# Patient Record
Sex: FEMALE | Race: WHITE | NOT HISPANIC OR LATINO | URBAN - METROPOLITAN AREA
[De-identification: names, ages, dates, MRNs, and addresses within clinical notes are randomized per-mention and may not be internally consistent; named-entity substitution may affect disease eponyms.]

---

## 2017-07-11 ENCOUNTER — HOSPITAL ENCOUNTER (OUTPATIENT)
Dept: OTHER | Facility: HOSPITAL | Age: 52
Discharge: HOME | End: 2017-07-11
Attending: FAMILY MEDICINE | Admitting: FAMILY MEDICINE

## 2017-07-11 ENCOUNTER — CONVERSION ENCOUNTER (OUTPATIENT)
Dept: FAMILY MEDICINE CLINIC | Facility: CLINIC | Age: 52
End: 2017-07-11

## 2017-07-12 LAB
ALBUMIN SERPL-MCNC: 4.3 G/DL (ref 3.6–5.1)
ALP SERPL-CCNC: 103 U/L (ref 33–130)
ALT SERPL-CCNC: 14 U/L (ref 6–29)
AST SERPL-CCNC: 13 U/L (ref 10–35)
BASOPHILS # BLD AUTO: 18 CELLS/UL (ref 0–200)
BASOPHILS NFR BLD AUTO: 0.2 %
BILIRUB SERPL-MCNC: 0.5 MG/DL (ref 0.2–1.2)
BUN SERPL-MCNC: 13 MG/DL (ref 7–25)
BUN/CREAT SERPL: ABNORMAL (CALC) (ref 6–22)
CALCIUM SERPL-MCNC: 9.3 MG/DL (ref 8.6–10.4)
CHLORIDE SERPL-SCNC: 106 MMOL/L (ref 98–110)
CHOLEST SERPL-MCNC: 220 MG/DL (ref 125–200)
CHOLEST/HDLC SERPL: 6.1 (CALC)
CONV CO2: 22 MMOL/L (ref 20–31)
CONV TOTAL PROTEIN: 6.7 G/DL (ref 6.1–8.1)
CREAT UR-MCNC: 0.77 MG/DL (ref 0.5–1.05)
EOSINOPHIL # BLD AUTO: 2.8 %
EOSINOPHIL # BLD AUTO: 252 CELLS/UL (ref 15–500)
ERYTHROCYTE [DISTWIDTH] IN BLOOD BY AUTOMATED COUNT: 13.3 % (ref 11–15)
GLOBULIN UR ELPH-MCNC: 2.4 MG/DL (ref 1.9–3.7)
GLUCOSE UR QL: 129 MG/DL (ref 65–99)
HCT VFR BLD AUTO: 39 % (ref 35–45)
HDLC SERPL-MCNC: 36 MG/DL
HGB BLD-MCNC: 12.7 G/DL (ref 11.7–15.5)
INSULIN SERPL-ACNC: 1.8 (CALC) (ref 1–2.5)
LDLC SERPL CALC-MCNC: 110 MG/DL
LYMPHOCYTES # BLD AUTO: 1755 CELLS/UL (ref 850–3900)
LYMPHOCYTES NFR BLD AUTO: 19.5 %
MCH RBC QN AUTO: 28.8 PG (ref 27–33)
MCHC RBC AUTO-ENTMCNC: 32.6 G/DL (ref 32–36)
MCV RBC AUTO: 88.4 FL (ref 80–100)
MONOCYTES # BLD AUTO: 342 CELLS/UL (ref 200–950)
MONOCYTES NFR BLD AUTO: 3.8 %
NEUTROPHILS # BLD AUTO: 6633 CELLS/UL (ref 1500–7800)
NEUTROPHILS NFR BLD AUTO: 73.7 %
NONHDLC SERPL-MCNC: 184 MG/DL
PLATELET # BLD AUTO: 283 10*3/UL (ref 140–400)
PMV BLD AUTO: 10 FL (ref 7.5–12.5)
POTASSIUM SERPL-SCNC: 4.1 MMOL/L (ref 3.5–5.3)
RBC # BLD AUTO: 4.41 MILLION/UL (ref 3.8–5.1)
SODIUM SERPL-SCNC: 139 MMOL/L (ref 135–146)
TRIGL SERPL-MCNC: 370 MG/DL
TSH SERPL-ACNC: 1.58 MIU/L
WBC # BLD AUTO: 9 10*3/UL (ref 3.8–10.8)

## 2024-02-26 ENCOUNTER — TRANSCRIBE ORDERS (OUTPATIENT)
Dept: ADMINISTRATIVE | Facility: HOSPITAL | Age: 59
End: 2024-02-26
Payer: COMMERCIAL

## 2024-02-26 ENCOUNTER — LAB (OUTPATIENT)
Dept: LAB | Facility: HOSPITAL | Age: 59
End: 2024-02-26
Payer: COMMERCIAL

## 2024-02-26 ENCOUNTER — HOSPITAL ENCOUNTER (OUTPATIENT)
Dept: GENERAL RADIOLOGY | Facility: HOSPITAL | Age: 59
Discharge: HOME | End: 2024-02-26
Payer: COMMERCIAL

## 2024-02-26 ENCOUNTER — HOSPITAL ENCOUNTER (OUTPATIENT)
Dept: CARDIOLOGY | Facility: HOSPITAL | Age: 59
Discharge: HOME | End: 2024-02-26
Payer: COMMERCIAL

## 2024-02-26 DIAGNOSIS — Z01.818 OTHER SPECIFIED PRE-OPERATIVE EXAMINATION: ICD-10-CM

## 2024-02-26 DIAGNOSIS — Z01.818 OTHER SPECIFIED PRE-OPERATIVE EXAMINATION: Primary | ICD-10-CM

## 2024-02-26 LAB
ABO GROUP BLD: NORMAL
ALBUMIN SERPL-MCNC: 4.4 G/DL (ref 3.5–5.2)
ALBUMIN/GLOB SERPL: 1.4 G/DL
ALP SERPL-CCNC: 159 U/L (ref 39–117)
ALT SERPL W P-5'-P-CCNC: 28 U/L (ref 1–33)
ANION GAP SERPL CALCULATED.3IONS-SCNC: 14 MMOL/L (ref 5–15)
AST SERPL-CCNC: 27 U/L (ref 1–32)
BASOPHILS # BLD AUTO: 0.1 10*3/MM3 (ref 0–0.2)
BASOPHILS NFR BLD AUTO: 0.6 % (ref 0–1.5)
BILIRUB SERPL-MCNC: 0.3 MG/DL (ref 0–1.2)
BLD GP AB SCN SERPL QL: NEGATIVE
BUN SERPL-MCNC: 13 MG/DL (ref 6–20)
BUN/CREAT SERPL: 21 (ref 7–25)
CALCIUM SPEC-SCNC: 9.8 MG/DL (ref 8.6–10.5)
CHLORIDE SERPL-SCNC: 99 MMOL/L (ref 98–107)
CO2 SERPL-SCNC: 23 MMOL/L (ref 22–29)
CREAT SERPL-MCNC: 0.62 MG/DL (ref 0.57–1)
DEPRECATED RDW RBC AUTO: 43.3 FL (ref 37–54)
EGFRCR SERPLBLD CKD-EPI 2021: 104 ML/MIN/1.73
EOSINOPHIL # BLD AUTO: 0.3 10*3/MM3 (ref 0–0.4)
EOSINOPHIL NFR BLD AUTO: 2.5 % (ref 0.3–6.2)
ERYTHROCYTE [DISTWIDTH] IN BLOOD BY AUTOMATED COUNT: 13.4 % (ref 12.3–15.4)
GLOBULIN UR ELPH-MCNC: 3.2 GM/DL
GLUCOSE SERPL-MCNC: 237 MG/DL (ref 65–99)
HCT VFR BLD AUTO: 42.1 % (ref 34–46.6)
HGB BLD-MCNC: 14 G/DL (ref 12–15.9)
LYMPHOCYTES # BLD AUTO: 3.7 10*3/MM3 (ref 0.7–3.1)
LYMPHOCYTES NFR BLD AUTO: 33.5 % (ref 19.6–45.3)
MCH RBC QN AUTO: 29.3 PG (ref 26.6–33)
MCHC RBC AUTO-ENTMCNC: 33.4 G/DL (ref 31.5–35.7)
MCV RBC AUTO: 87.7 FL (ref 79–97)
MONOCYTES # BLD AUTO: 0.5 10*3/MM3 (ref 0.1–0.9)
MONOCYTES NFR BLD AUTO: 4.2 % (ref 5–12)
NEUTROPHILS NFR BLD AUTO: 59.2 % (ref 42.7–76)
NEUTROPHILS NFR BLD AUTO: 6.5 10*3/MM3 (ref 1.7–7)
NRBC BLD AUTO-RTO: 0 /100 WBC (ref 0–0.2)
PLATELET # BLD AUTO: 332 10*3/MM3 (ref 140–450)
PMV BLD AUTO: 8.8 FL (ref 6–12)
POTASSIUM SERPL-SCNC: 3.8 MMOL/L (ref 3.5–5.2)
PROT SERPL-MCNC: 7.6 G/DL (ref 6–8.5)
RBC # BLD AUTO: 4.8 10*6/MM3 (ref 3.77–5.28)
RH BLD: POSITIVE
SODIUM SERPL-SCNC: 136 MMOL/L (ref 136–145)
T&S EXPIRATION DATE: NORMAL
WBC NRBC COR # BLD AUTO: 10.9 10*3/MM3 (ref 3.4–10.8)

## 2024-02-26 PROCEDURE — 86850 RBC ANTIBODY SCREEN: CPT

## 2024-02-26 PROCEDURE — 86901 BLOOD TYPING SEROLOGIC RH(D): CPT

## 2024-02-26 PROCEDURE — 86900 BLOOD TYPING SEROLOGIC ABO: CPT

## 2024-02-26 PROCEDURE — 36415 COLL VENOUS BLD VENIPUNCTURE: CPT

## 2024-02-26 PROCEDURE — 80053 COMPREHEN METABOLIC PANEL: CPT

## 2024-02-26 PROCEDURE — 93005 ELECTROCARDIOGRAM TRACING: CPT | Performed by: OBSTETRICS & GYNECOLOGY

## 2024-02-26 PROCEDURE — 71046 X-RAY EXAM CHEST 2 VIEWS: CPT

## 2024-02-26 PROCEDURE — 85025 COMPLETE CBC W/AUTO DIFF WBC: CPT

## 2024-02-26 PROCEDURE — 93010 ELECTROCARDIOGRAM REPORT: CPT | Performed by: INTERNAL MEDICINE

## 2024-02-27 ENCOUNTER — TELEPHONE (OUTPATIENT)
Dept: CARDIOLOGY | Facility: CLINIC | Age: 59
End: 2024-02-27
Payer: COMMERCIAL

## 2024-02-27 NOTE — TELEPHONE ENCOUNTER
Called patient and left message to call back. She needs to make apt for cardiac clearance.   I have apt 2/28 @ 9:15 with Dr. Jose J kelly for her.

## 2024-02-27 NOTE — TELEPHONE ENCOUNTER
FACILITY: [facility]  DR: ZAFAR KHAN  PHONE: [phone]  FAX: [phone]  PROCEDURE: POSTERIOR REPAIR SCHEDULED: 3/5/24  MEDS TO HOLD:     PATIENT HAS NEW PATIENT APT 2/28/24 WITH DR. CHAIREZ.

## 2024-02-28 ENCOUNTER — TELEPHONE (OUTPATIENT)
Dept: CARDIOLOGY | Facility: CLINIC | Age: 59
End: 2024-02-28

## 2024-02-28 ENCOUNTER — OFFICE VISIT (OUTPATIENT)
Dept: CARDIOLOGY | Facility: CLINIC | Age: 59
End: 2024-02-28
Payer: COMMERCIAL

## 2024-02-28 VITALS
BODY MASS INDEX: 31.07 KG/M2 | DIASTOLIC BLOOD PRESSURE: 78 MMHG | HEART RATE: 91 BPM | HEIGHT: 68 IN | WEIGHT: 205 LBS | SYSTOLIC BLOOD PRESSURE: 133 MMHG | OXYGEN SATURATION: 97 %

## 2024-02-28 DIAGNOSIS — Z13.9 SCREENING DUE: ICD-10-CM

## 2024-02-28 DIAGNOSIS — E66.09 CLASS 1 OBESITY DUE TO EXCESS CALORIES WITHOUT SERIOUS COMORBIDITY WITH BODY MASS INDEX (BMI) OF 31.0 TO 31.9 IN ADULT: ICD-10-CM

## 2024-02-28 DIAGNOSIS — R01.1 HEART MURMUR: ICD-10-CM

## 2024-02-28 DIAGNOSIS — Z01.810 PRE-OPERATIVE CARDIOVASCULAR EXAMINATION: Primary | ICD-10-CM

## 2024-02-28 DIAGNOSIS — I10 PRIMARY HYPERTENSION: ICD-10-CM

## 2024-02-28 LAB
QT INTERVAL: 343 MS
QTC INTERVAL: 446 MS

## 2024-02-28 RX ORDER — ASPIRIN 81 MG/1
81 TABLET ORAL DAILY
COMMUNITY
Start: 2017-07-17

## 2024-02-28 RX ORDER — LISINOPRIL 20 MG/1
20 TABLET ORAL DAILY
COMMUNITY
Start: 2024-01-19

## 2024-02-28 RX ORDER — IBUPROFEN 200 MG
200 TABLET ORAL EVERY 6 HOURS PRN
COMMUNITY

## 2024-02-28 RX ORDER — TOPIRAMATE 100 MG/1
TABLET, FILM COATED ORAL
COMMUNITY
Start: 2017-08-23 | End: 2024-02-28

## 2024-02-28 RX ORDER — ACETAMINOPHEN 325 MG/1
325 TABLET ORAL EVERY 4 HOURS PRN
COMMUNITY

## 2024-02-28 RX ORDER — AMLODIPINE BESYLATE 5 MG/1
1 TABLET ORAL DAILY
COMMUNITY
Start: 2024-01-18

## 2024-02-28 NOTE — PROGRESS NOTES
Encounter Date:2024        Patient ID: Kelli Ramsey is a 58 y.o. female.      Chief Complaint:      History of Present Illness  58 years old woman with hypertension currently on lisinopril and amlodipine, obesity who presents to cardiology office to establish care.  She seeks preoperative cardiovascular risk assessment prior to undergoing posterior repair.  She was noted to have an abnormal ECG which showed sinus tachycardia, poor R wave progression in the anterior leads and suggestion of Q waves secondary to left ventricular hypertrophy.  She currently denies any chest pain or shortness of breath.  She has had a murmur.  Her brother  suddenly.  She does not smoke or drink.  She denies any leg edema, orthopnea or PND.    The following portions of the patient's history were reviewed and updated as appropriate: allergies, current medications, past family history, past medical history, past social history, past surgical history, and problem list.    Review of Systems   Constitutional: Negative for malaise/fatigue.   Cardiovascular:  Negative for chest pain, dyspnea on exertion, leg swelling and palpitations.   Respiratory:  Negative for cough and shortness of breath.    Gastrointestinal:  Negative for abdominal pain, nausea and vomiting.   Neurological:  Negative for dizziness, focal weakness, headaches, light-headedness and numbness.   All other systems reviewed and are negative.        Current Outpatient Medications:     acetaminophen (Tylenol) 325 MG tablet, Take 1 tablet by mouth Every 4 (Four) Hours As Needed., Disp: , Rfl:     amLODIPine (NORVASC) 5 MG tablet, Take 1 tablet by mouth Daily., Disp: , Rfl:     ibuprofen (ADVIL,MOTRIN) 200 MG tablet, Take 1 tablet by mouth Every 6 (Six) Hours As Needed., Disp: , Rfl:     lisinopril (PRINIVIL,ZESTRIL) 20 MG tablet, Take 1 tablet by mouth Daily., Disp: , Rfl:     NON FORMULARY, Allergy pill prn, Disp: , Rfl:     aspirin (Ecotrin Low Strength) 81 MG EC  "tablet, Take 1 tablet by mouth Daily. (Patient not taking: Reported on 2/28/2024), Disp: , Rfl:     Current outpatient and discharge medications have been reconciled for the patient.  Reviewed by: Sanya Lux MD       No Known Allergies    History reviewed. No pertinent family history.    Past Surgical History:   Procedure Laterality Date    ANKLE SURGERY Left 2019    CYSTOSCOPY BLADDER STONE LITHOTRIPSY  2003       Past Medical History:   Diagnosis Date    Abnormal ECG     Hypertension        History reviewed. No pertinent family history.    Social History     Socioeconomic History    Marital status:    Tobacco Use    Smoking status: Never    Smokeless tobacco: Never   Vaping Use    Vaping Use: Never used   Substance and Sexual Activity    Alcohol use: Yes    Drug use: Defer    Sexual activity: Defer               Objective:       Physical Exam    /78   Pulse 91   Ht 172.7 cm (68\")   Wt 93 kg (205 lb)   SpO2 97%   BMI 31.17 kg/m²   The patient is alert, oriented and in no distress.    Vital signs as noted above.    Head and neck revealed no carotid bruits or jugular venous distension.  No thyromegaly or lymphadenopathy is present.    Lungs clear.  No wheezing.  Breath sounds are normal bilaterally.    Heart normal first and second heart sounds.  No murmur..  No pericardial rub is present.  No gallop is present.    Abdomen soft and nontender.  No organomegaly is present.    Extremities revealed good peripheral pulses without any pedal edema.    Skin warm and dry.    Musculoskeletal system is grossly normal.    CNS grossly normal.           Diagnosis Plan   1. Pre-operative cardiovascular examination  Lipid Panel    Hemoglobin A1c      2. Primary hypertension        3. Heart murmur  Adult Transthoracic Echo Complete W/ Cont if Necessary Per Protocol      4. Screening due  Lipid Panel      LAB RESULTS (LAST 7 DAYS)    CBC  Results from last 7 days   Lab Units 02/26/24  1634   WBC 10*3/mm3 10.90* "   RBC 10*6/mm3 4.80   HEMOGLOBIN g/dL 14.0   HEMATOCRIT % 42.1   MCV fL 87.7   PLATELETS 10*3/mm3 332       BMP  Results from last 7 days   Lab Units 02/26/24  1634   SODIUM mmol/L 136   POTASSIUM mmol/L 3.8   CHLORIDE mmol/L 99   CO2 mmol/L 23.0   BUN mg/dL 13   CREATININE mg/dL 0.62   GLUCOSE mg/dL 237*       CMP   Results from last 7 days   Lab Units 02/26/24  1634   SODIUM mmol/L 136   POTASSIUM mmol/L 3.8   CHLORIDE mmol/L 99   CO2 mmol/L 23.0   BUN mg/dL 13   CREATININE mg/dL 0.62   GLUCOSE mg/dL 237*   ALBUMIN g/dL 4.4   BILIRUBIN mg/dL 0.3   ALK PHOS U/L 159*   AST (SGOT) U/L 27   ALT (SGPT) U/L 28         BNP        TROPONIN        CoAg        Creatinine Clearance  Estimated Creatinine Clearance: 119.3 mL/min (by C-G formula based on SCr of 0.62 mg/dL).    ABG        Radiology  XR Chest 2 View    Result Date: 2/27/2024  Impression: Chronic scarring in the left lung. No acute chest findings. Electronically Signed: Jossy Lopez MD  2/27/2024 9:00 AM EST  Workstation ID: [id number]     EKG  Procedures    Stress test      Echocardiogram      Cardiac catheterization  No results found for this or any previous visit.          Assessment and Plan       Diagnoses and all orders for this visit:    1. Pre-operative cardiovascular examination (Primary)  In the absence of angina, arrhythmia or heart failure symptoms she may proceed with posterior repair for rectocele with no further cardiac workup.  I have reviewed the ECG which shows evidence of left ventricular hypertrophy due to long-term hypertension.  Aspirin will be stopped prior to the procedure.    2. Primary hypertension  Currently on lisinopril and amlodipine.  I have recommended her to take lisinopril in the morning and amlodipine at night.  She will maintain a blood pressure log and bring it during next visit.  I have seen her blood pressure log which shows systolic blood pressure in the 120s at home.    3. Heart murmur  -     Adult Transthoracic Echo  Complete W/ Cont if Necessary Per Protocol  She has a systolic murmur at the right upper sternal border.  She also has a history of sudden cardiac death in her brother.  I will obtain an echocardiogram.  Echocardiogram is not needed for surgery clearance.    4. Screening due  I will obtain a lipid panel and A1c to screen her for hyperlipidemia and diabetes.    5.  Obesity  BMI is 31.17  Lifestyle modifications recommended to the patient  Also recommended screening and treatment for sleep apnea

## 2024-02-28 NOTE — TELEPHONE ENCOUNTER
Patient seen at office visit with Dr. Lux who signed cardiac clearance note for upcoming Posterior repair surgery with Dr. Holly Gao; Clearance note faxed to [phone]

## 2024-04-22 ENCOUNTER — HOSPITAL ENCOUNTER (OUTPATIENT)
Dept: CARDIOLOGY | Facility: HOSPITAL | Age: 59
Discharge: HOME | End: 2024-04-22
Admitting: INTERNAL MEDICINE
Payer: COMMERCIAL

## 2024-04-22 VITALS
BODY MASS INDEX: 31.07 KG/M2 | WEIGHT: 205 LBS | SYSTOLIC BLOOD PRESSURE: 170 MMHG | DIASTOLIC BLOOD PRESSURE: 82 MMHG | HEIGHT: 68 IN

## 2024-04-22 LAB
BH CV ECHO MEAS - ACS: 1.59 CM
BH CV ECHO MEAS - AI P1/2T: 542.5 MSEC
BH CV ECHO MEAS - AO MAX PG: 22.2 MMHG
BH CV ECHO MEAS - AO MEAN PG: 10.1 MMHG
BH CV ECHO MEAS - AO ROOT DIAM: 3.1 CM
BH CV ECHO MEAS - AO V2 MAX: 235.4 CM/SEC
BH CV ECHO MEAS - AO V2 VTI: 44 CM
BH CV ECHO MEAS - AVA(I,D): 1.99 CM2
BH CV ECHO MEAS - EDV(CUBED): 105.7 ML
BH CV ECHO MEAS - EDV(MOD-SP4): 60.4 ML
BH CV ECHO MEAS - EF(MOD-BP): 65 %
BH CV ECHO MEAS - EF(MOD-SP4): 69.2 %
BH CV ECHO MEAS - ESV(CUBED): 31.3 ML
BH CV ECHO MEAS - ESV(MOD-SP4): 18.6 ML
BH CV ECHO MEAS - FS: 33.3 %
BH CV ECHO MEAS - IVS/LVPW: 1 CM
BH CV ECHO MEAS - IVSD: 1.2 CM
BH CV ECHO MEAS - LA DIMENSION: 3.1 CM
BH CV ECHO MEAS - LV DIASTOLIC VOL/BSA (35-75): 29.2 CM2
BH CV ECHO MEAS - LV MASS(C)D: 214.2 GRAMS
BH CV ECHO MEAS - LV MAX PG: 7 MMHG
BH CV ECHO MEAS - LV MEAN PG: 3.5 MMHG
BH CV ECHO MEAS - LV SYSTOLIC VOL/BSA (12-30): 9 CM2
BH CV ECHO MEAS - LV V1 MAX: 132 CM/SEC
BH CV ECHO MEAS - LV V1 VTI: 30.4 CM
BH CV ECHO MEAS - LVIDD: 4.7 CM
BH CV ECHO MEAS - LVIDS: 3.2 CM
BH CV ECHO MEAS - LVOT AREA: 2.9 CM2
BH CV ECHO MEAS - LVOT DIAM: 1.92 CM
BH CV ECHO MEAS - LVPWD: 1.2 CM
BH CV ECHO MEAS - MR MAX PG: 72.3 MMHG
BH CV ECHO MEAS - MR MAX VEL: 424.8 CM/SEC
BH CV ECHO MEAS - MV A MAX VEL: 145.6 CM/SEC
BH CV ECHO MEAS - MV DEC SLOPE: 337.2 CM/SEC2
BH CV ECHO MEAS - MV DEC TIME: 0.31 SEC
BH CV ECHO MEAS - MV E MAX VEL: 104.9 CM/SEC
BH CV ECHO MEAS - MV E/A: 0.72
BH CV ECHO MEAS - MV MAX PG: 8.8 MMHG
BH CV ECHO MEAS - MV MEAN PG: 3.6 MMHG
BH CV ECHO MEAS - MV V2 VTI: 34.7 CM
BH CV ECHO MEAS - MVA(VTI): 2.5 CM2
BH CV ECHO MEAS - PA ACC TIME: 0.09 SEC
BH CV ECHO MEAS - PA V2 MAX: 116.9 CM/SEC
BH CV ECHO MEAS - PI END-D VEL: 104.5 CM/SEC
BH CV ECHO MEAS - PULM A REVS DUR: 0.09 SEC
BH CV ECHO MEAS - PULM A REVS VEL: 35 CM/SEC
BH CV ECHO MEAS - PULM DIAS VEL: 39.3 CM/SEC
BH CV ECHO MEAS - PULM S/D: 1.94
BH CV ECHO MEAS - PULM SYS VEL: 76.1 CM/SEC
BH CV ECHO MEAS - RAP SYSTOLE: 3 MMHG
BH CV ECHO MEAS - RVSP: 41.8 MMHG
BH CV ECHO MEAS - SV(LVOT): 87.7 ML
BH CV ECHO MEAS - SV(MOD-SP4): 41.8 ML
BH CV ECHO MEAS - SVI(MOD-SP4): 20.2 ML/M2
BH CV ECHO MEAS - TAPSE (>1.6): 2.6 CM
BH CV ECHO MEAS - TR MAX PG: 38.8 MMHG
BH CV ECHO MEAS - TR MAX VEL: 302.7 CM/SEC
BH CV XLRA - RV BASE: 3.7 CM
BH CV XLRA - RV MID: 1.9 CM

## 2024-04-22 PROCEDURE — 93306 TTE W/DOPPLER COMPLETE: CPT

## 2024-04-22 PROCEDURE — 93306 TTE W/DOPPLER COMPLETE: CPT | Performed by: INTERNAL MEDICINE

## 2024-04-28 LAB
AMBIG ABBREV LP DEFAULT: NORMAL
CHOLEST SERPL-MCNC: 268 MG/DL (ref 100–199)
HDLC SERPL-MCNC: 44 MG/DL
LDLC SERPL CALC-MCNC: 163 MG/DL (ref 0–99)
TRIGL SERPL-MCNC: 320 MG/DL (ref 0–149)
VLDLC SERPL CALC-MCNC: 61 MG/DL (ref 5–40)

## 2024-05-01 ENCOUNTER — TELEPHONE (OUTPATIENT)
Dept: CARDIOLOGY | Facility: CLINIC | Age: 59
End: 2024-05-01
Payer: COMMERCIAL

## 2024-05-01 DIAGNOSIS — E78.2 MIXED HYPERLIPIDEMIA: Primary | ICD-10-CM

## 2024-05-01 RX ORDER — ATORVASTATIN CALCIUM 40 MG/1
40 TABLET, FILM COATED ORAL DAILY
Qty: 90 TABLET | Refills: 3 | Status: SHIPPED | OUTPATIENT
Start: 2024-05-01

## 2024-05-01 NOTE — TELEPHONE ENCOUNTER
Called and spoke to patient. Reviewed results of recent lipid panel. She will be started on atorvastatin 40 mg daily. Lifestyle modifications, including diet and exercise, also recommended. She will get a repeat CMP and lipid panel in 3 months. Patient verbalized understanding and is agreeable to this plan. New prescription sent to [facility] in [place].     Electronically signed by XIMENA Lovett, 05/01/24, 2:16 PM EDT.

## 2024-05-26 NOTE — PROGRESS NOTES
Encounter Date:2024        Patient ID: Kelli Ramsey is a 58 y.o. female.      Chief Complaint:      History of Present Illness  58 years old woman with hypertension, hyperlipidemia, obesity presents to cardiology office for follow-up..    She was noted to have an abnormal ECG which showed sinus tachycardia, poor R wave progression in the anterior leads and suggestion of Q waves secondary to left ventricular hypertrophy.    Her brother  suddenly.  She does not smoke or drink.  She denies any leg edema, orthopnea or PND.    Current cardiac medications include amlodipine, lisinopril, atorvastatin.    The following portions of the patient's history were reviewed and updated as appropriate: allergies, current medications, past family history, past medical history, past social history, past surgical history, and problem list.    Review of Systems   Constitutional: Negative for malaise/fatigue.   Cardiovascular:  Negative for chest pain, dyspnea on exertion, leg swelling and palpitations.   Respiratory:  Negative for cough and shortness of breath.    Gastrointestinal:  Negative for abdominal pain, nausea and vomiting.   Neurological:  Negative for dizziness, focal weakness, headaches, light-headedness and numbness.   All other systems reviewed and are negative.        Current Outpatient Medications:     acetaminophen (Tylenol) 325 MG tablet, Take 1 tablet by mouth Every 4 (Four) Hours As Needed., Disp: , Rfl:     amLODIPine (NORVASC) 5 MG tablet, Take 1 tablet by mouth Daily., Disp: , Rfl:     atorvastatin (LIPITOR) 40 MG tablet, Take 1 tablet by mouth Daily., Disp: 90 tablet, Rfl: 3    estradiol (ESTRACE) 0.1 MG/GM vaginal cream, Insert 2 g into the vagina Daily., Disp: , Rfl:     ibuprofen (ADVIL,MOTRIN) 200 MG tablet, Take 1 tablet by mouth Every 6 (Six) Hours As Needed., Disp: , Rfl:     lisinopril (PRINIVIL,ZESTRIL) 20 MG tablet, Take 1 tablet by mouth Daily., Disp: , Rfl:     Current outpatient and  "discharge medications have been reconciled for the patient.  Reviewed by: Sanya Lux MD       No Known Allergies    Family History   Problem Relation Age of Onset    Hypertension Mother     Hypertension Father        Past Surgical History:   Procedure Laterality Date    ANKLE SURGERY Left 2019    CYSTOSCOPY BLADDER STONE LITHOTRIPSY  2003    RECTOCELE REPAIR         Past Medical History:   Diagnosis Date    Abnormal ECG     Hypertension        Family History   Problem Relation Age of Onset    Hypertension Mother     Hypertension Father        Social History     Socioeconomic History    Marital status:    Tobacco Use    Smoking status: Never    Smokeless tobacco: Never   Vaping Use    Vaping status: Never Used   Substance and Sexual Activity    Alcohol use: Yes    Drug use: Defer    Sexual activity: Defer               Objective:       Physical Exam    /90 (BP Location: Left arm, Patient Position: Sitting, Cuff Size: Large Adult)   Pulse 95   Ht 170.2 cm (67\")   Wt 93.4 kg (206 lb)   LMP  (LMP Unknown)   SpO2 98%   BMI 32.26 kg/m²   The patient is alert, oriented and in no distress.    Vital signs as noted above.    Head and neck revealed no carotid bruits or jugular venous distension.  No thyromegaly or lymphadenopathy is present.    Lungs clear.  No wheezing.  Breath sounds are normal bilaterally.    Heart normal first and second heart sounds.  Early peaking systolic ejection murmur at right upper sternal border..  No pericardial rub is present.  No gallop is present.    Abdomen soft and nontender.  No organomegaly is present.    Extremities revealed good peripheral pulses without any pedal edema.    Skin warm and dry.    Musculoskeletal system is grossly normal.    CNS grossly normal.           Diagnosis Plan   1. Mixed hyperlipidemia        2. Pre-operative cardiovascular examination        3. Primary hypertension        4. Heart murmur        5. Screening due        6. Class 1 obesity due to " excess calories without serious comorbidity with body mass index (BMI) of 31.0 to 31.9 in adult        7. Other specified pre-operative examination        LAB RESULTS (LAST 7 DAYS)    CBC        BMP        CMP         BNP        TROPONIN        CoAg        Creatinine Clearance  CrCl cannot be calculated (Patient's most recent lab result is older than the maximum 30 days allowed.).    ABG        Radiology  No radiology results for the last day    EKG  Procedures    Stress test      Echocardiogram  Results for orders placed in visit on 02/28/24    Adult Transthoracic Echo Complete W/ Cont if Necessary Per Protocol    Interpretation Summary    Left ventricular ejection fraction appears to be 61 - 65%.    Left ventricular diastolic function is consistent with (grade I) impaired relaxation.    Mild aortic valve stenosis is present. Mean/peak gradient of 10/22 mmHg. JULIA 1.9cm2.    Estimated right ventricular systolic pressure from tricuspid regurgitation is mildly elevated (35-45 mmHg).      Cardiac catheterization  No results found for this or any previous visit.          Assessment and Plan       Diagnoses and all orders for this visit:    1. Mixed hyperlipidemia (Primary)    2. Pre-operative cardiovascular examination    3. Primary hypertension    4. Heart murmur    5. Screening due    6. Class 1 obesity due to excess calories without serious comorbidity with body mass index (BMI) of 31.0 to 31.9 in adult    7. Other specified pre-operative examination         1. Pre-operative cardiovascular examination (Primary)  In the absence of angina, arrhythmia or heart failure symptoms she may proceed with posterior repair for rectocele with no further cardiac workup.  I have reviewed the ECG which shows evidence of left ventricular hypertrophy due to long-term hypertension.  Aspirin will be stopped prior to the procedure.     2. Primary hypertension  Currently on lisinopril and amlodipine.  I have recommended her to take  lisinopril in the morning and amlodipine at night.  Blood pressure is normal  Home blood pressure logs of systolic blood pressure in the 120s.     3. Heart murmur  Echocardiogram suggestive of mild aortic valve stenosis with mean/peak gradient of 10/22 mmHg  She has a systolic murmur at the right upper sternal border.  She also has a history of sudden cardiac death in her brother.  Will follow-up based on symptoms and repeat echo in 2 years.  February 2026  Patient is currently asymptomatic.     4. Screening due  , HDL 44, triglyceride 320 and total cholesterol 268  Started on Lipitor  Repeat lipid panel and A1c in August 2024     5.  Obesity  BMI is 32.3.  She weighs 206 pounds  Lifestyle modifications recommended to the patient.  Specially discussed dietary changes and exercise.  Also recommended screening and treatment for sleep apnea

## 2024-05-28 ENCOUNTER — OFFICE VISIT (OUTPATIENT)
Dept: CARDIOLOGY | Facility: CLINIC | Age: 59
End: 2024-05-28
Payer: COMMERCIAL

## 2024-05-28 VITALS
SYSTOLIC BLOOD PRESSURE: 139 MMHG | BODY MASS INDEX: 32.33 KG/M2 | DIASTOLIC BLOOD PRESSURE: 88 MMHG | OXYGEN SATURATION: 98 % | HEIGHT: 67 IN | WEIGHT: 206 LBS | HEART RATE: 95 BPM

## 2024-05-28 DIAGNOSIS — Z13.9 SCREENING DUE: ICD-10-CM

## 2024-05-28 DIAGNOSIS — E78.2 MIXED HYPERLIPIDEMIA: Primary | ICD-10-CM

## 2024-05-28 DIAGNOSIS — I10 PRIMARY HYPERTENSION: ICD-10-CM

## 2024-05-28 DIAGNOSIS — Z01.818 OTHER SPECIFIED PRE-OPERATIVE EXAMINATION: ICD-10-CM

## 2024-05-28 DIAGNOSIS — E66.09 CLASS 1 OBESITY DUE TO EXCESS CALORIES WITHOUT SERIOUS COMORBIDITY WITH BODY MASS INDEX (BMI) OF 31.0 TO 31.9 IN ADULT: ICD-10-CM

## 2024-05-28 DIAGNOSIS — Z01.810 PRE-OPERATIVE CARDIOVASCULAR EXAMINATION: ICD-10-CM

## 2024-05-28 DIAGNOSIS — R01.1 HEART MURMUR: ICD-10-CM

## 2024-05-28 PROCEDURE — 99214 OFFICE O/P EST MOD 30 MIN: CPT | Performed by: INTERNAL MEDICINE

## 2024-05-28 RX ORDER — ESTRADIOL 0.1 MG/G
2 CREAM VAGINAL DAILY
COMMUNITY
Start: 2024-04-18

## 2024-09-04 ENCOUNTER — TELEPHONE (OUTPATIENT)
Dept: CARDIOLOGY | Facility: CLINIC | Age: 59
End: 2024-09-04
Payer: COMMERCIAL

## 2024-09-04 NOTE — TELEPHONE ENCOUNTER
CALLED PATIENT TO REMIND HER ABOUT OVER DUE LABS FOR LIPID PANEL (WHICH IS A FASTING LAB), CMP, & A1C.   NO ANSWER, LMOM

## 2024-09-04 NOTE — TELEPHONE ENCOUNTER
PATIENT CALLED BACK. HER MOTHER REQUIRES 24 HOUR CARE AND SHE HAS NOT HAD TIME TO DO LABS. SHE DOES HAVE IT ON HER TO-DO LIST AND PLANS ON GOING ON A SATURDAY IN SEPTEMBER.

## 2024-10-11 LAB
ALBUMIN SERPL-MCNC: 4.6 G/DL (ref 3.8–4.9)
ALP SERPL-CCNC: 156 IU/L (ref 44–121)
ALT SERPL-CCNC: 22 IU/L (ref 0–32)
AMBIG ABBREV CMP14 DEFAULT: NORMAL
AMBIG ABBREV LP DEFAULT: NORMAL
AST SERPL-CCNC: 20 IU/L (ref 0–40)
BILIRUB SERPL-MCNC: 0.9 MG/DL (ref 0–1.2)
BUN SERPL-MCNC: 9 MG/DL (ref 6–24)
BUN/CREAT SERPL: 14 (ref 9–23)
CALCIUM SERPL-MCNC: 9.8 MG/DL (ref 8.7–10.2)
CHLORIDE SERPL-SCNC: 99 MMOL/L (ref 96–106)
CHOLEST SERPL-MCNC: 171 MG/DL (ref 100–199)
CO2 SERPL-SCNC: 24 MMOL/L (ref 20–29)
CREAT SERPL-MCNC: 0.63 MG/DL (ref 0.57–1)
EGFRCR SERPLBLD CKD-EPI 2021: 103 ML/MIN/1.73
GLOBULIN SER CALC-MCNC: 2.4 G/DL (ref 1.5–4.5)
GLUCOSE SERPL-MCNC: 285 MG/DL (ref 70–99)
HBA1C MFR BLD: 13.5 % (ref 4.8–5.6)
HDLC SERPL-MCNC: 46 MG/DL
LDLC SERPL CALC-MCNC: 88 MG/DL (ref 0–99)
POTASSIUM SERPL-SCNC: 4.3 MMOL/L (ref 3.5–5.2)
PROT SERPL-MCNC: 7 G/DL (ref 6–8.5)
SODIUM SERPL-SCNC: 138 MMOL/L (ref 134–144)
TRIGL SERPL-MCNC: 220 MG/DL (ref 0–149)
VLDLC SERPL CALC-MCNC: 37 MG/DL (ref 5–40)

## 2024-10-14 ENCOUNTER — TELEPHONE (OUTPATIENT)
Dept: CARDIOLOGY | Facility: CLINIC | Age: 59
End: 2024-10-14
Payer: COMMERCIAL

## 2024-10-14 NOTE — TELEPHONE ENCOUNTER
Called and spoke to patient. Reviewed results of recent CMP, lipid panel and A1c. She does not have a prior diagnosis of diabetes mellitus. Her A1c is elevated at 13.5%. She was advised to make an appointment ASAP with her PCP for further treatment recommendations. She verbalized understanding and states she will call and make an appointment with Dr. Scott.     Electronically signed by XIMENA Lovett, 10/14/24, 10:44 AM EDT.

## 2025-01-21 ENCOUNTER — TELEPHONE (OUTPATIENT)
Dept: FAMILY MEDICINE CLINIC | Facility: CLINIC | Age: 60
End: 2025-01-21
Payer: COMMERCIAL

## 2025-01-21 NOTE — TELEPHONE ENCOUNTER
Caller: MARLEY ALVARES    Relationship:     Best call back number: [phone]    What is the best time to reach you: ANYTIME    Who are you requesting to speak with (clinical staff, provider,  specific staff member): CLINICAL     What was the call regarding: CALLING TO SEE IF THE OFFICE RECEIVED THE MEDICAL RECORD REQUEST FAX. PLEASE CALL BACK TO CONFIRM     Is it okay if the provider responds through MyChart: NO

## 2025-01-21 NOTE — TELEPHONE ENCOUNTER
NO RECEIPT OF MEDICAL RECORD REQUEST FROM [facility]. TRIED CALLING [facility]. UNABLE TO REACH A PERSON.

## 2025-03-28 NOTE — PROGRESS NOTES
Encounter Date:2025        Patient ID: Kelli Ramsey is a 59 y.o. female.      Chief Complaint:      History of Present Illness  59 years old woman with hypertension, hyperlipidemia, diabetes obesity presents to cardiology office for follow-up..    She was noted to have an abnormal ECG which showed sinus tachycardia, poor R wave progression in the anterior leads and suggestion of Q waves secondary to left ventricular hypertrophy.    Her brother  suddenly.  She does not smoke or drink.  She denies any leg edema, orthopnea or PND.     Current cardiac medications include amlodipine, lisinopril, atorvastatin.    She recently lost her mother who was suffering from dementia and had a fall/seizure.    She has done an excellent job in losing weight, controlling diabetes, hyperlipidemia.  She brings in a blood pressure log which shows excellent control of blood pressure.    The following portions of the patient's history were reviewed and updated as appropriate: allergies, current medications, past family history, past medical history, past social history, past surgical history, and problem list.    Review of Systems   Constitutional: Negative for malaise/fatigue.   Cardiovascular:  Negative for chest pain, dyspnea on exertion, leg swelling and palpitations.   Respiratory:  Negative for cough and shortness of breath.    Gastrointestinal:  Negative for abdominal pain, nausea and vomiting.   Neurological:  Negative for dizziness, focal weakness, headaches, light-headedness and numbness.   All other systems reviewed and are negative.        Current Outpatient Medications:     acetaminophen (Tylenol) 325 MG tablet, Take 1 tablet by mouth Every 4 (Four) Hours As Needed., Disp: , Rfl:     amLODIPine (NORVASC) 5 MG tablet, Take 1 tablet by mouth Daily., Disp: , Rfl:     atorvastatin (LIPITOR) 40 MG tablet, Take 1 tablet by mouth Daily., Disp: 90 tablet, Rfl: 3    estradiol (ESTRACE) 0.1 MG/GM vaginal cream, Insert 2 g  "into the vagina Daily., Disp: , Rfl:     glipizide (GLUCOTROL XL) 2.5 MG 24 hr tablet, Take 1 tablet by mouth Daily., Disp: , Rfl:     ibuprofen (ADVIL,MOTRIN) 200 MG tablet, Take 1 tablet by mouth Every 6 (Six) Hours As Needed., Disp: , Rfl:     lisinopril (PRINIVIL,ZESTRIL) 20 MG tablet, Take 1 tablet by mouth Daily., Disp: , Rfl:     metFORMIN (GLUCOPHAGE) 500 MG tablet, Take 1 tablet by mouth 2 (Two) Times a Day., Disp: , Rfl:     Current outpatient and discharge medications have been reconciled for the patient.  Reviewed by: Sanya Lux MD       No Known Allergies    Family History   Problem Relation Age of Onset    Hypertension Mother     Hypertension Father        Past Surgical History:   Procedure Laterality Date    ANKLE SURGERY Left 2019    CYSTOSCOPY BLADDER STONE LITHOTRIPSY  2003    RECTOCELE REPAIR         Past Medical History:   Diagnosis Date    Abnormal ECG     Hypertension        Family History   Problem Relation Age of Onset    Hypertension Mother     Hypertension Father        Social History     Socioeconomic History    Marital status:    Tobacco Use    Smoking status: Never     Passive exposure: Never    Smokeless tobacco: Never   Vaping Use    Vaping status: Never Used   Substance and Sexual Activity    Alcohol use: Yes    Drug use: Defer    Sexual activity: Defer               Objective:       Physical Exam    /96 (BP Location: Right arm, Patient Position: Sitting, Cuff Size: Adult)   Pulse 91   Ht 170.2 cm (67\")   Wt 89.5 kg (197 lb 6.4 oz)   LMP  (LMP Unknown)   SpO2 97%   BMI 30.92 kg/m²   The patient is alert, oriented and in no distress.    Vital signs as noted above.    Head and neck revealed no carotid bruits or jugular venous distension.  No thyromegaly or lymphadenopathy is present.    Lungs clear.  No wheezing.  Breath sounds are normal bilaterally.    Heart normal first and second heart sounds.  No murmur..  No pericardial rub is present.  No gallop is " present.    Abdomen soft and nontender.  No organomegaly is present.    Extremities revealed good peripheral pulses without any pedal edema.    Skin warm and dry.    Musculoskeletal system is grossly normal.    CNS grossly normal.           Diagnosis Plan   1. Pre-operative cardiovascular examination        2. Primary hypertension        3. Mixed hyperlipidemia        4. Heart murmur        5. Screening due        6. Class 1 obesity due to excess calories without serious comorbidity with body mass index (BMI) of 31.0 to 31.9 in adult        LAB RESULTS (LAST 7 DAYS)    CBC        BMP        CMP         BNP        TROPONIN        CoAg        Creatinine Clearance  CrCl cannot be calculated (Patient's most recent lab result is older than the maximum 30 days allowed.).    ABG        Radiology  No radiology results for the last day    EKG  Procedures    Stress test      Echocardiogram  Results for orders placed in visit on 02/28/24    Adult Transthoracic Echo Complete W/ Cont if Necessary Per Protocol    Interpretation Summary    Left ventricular ejection fraction appears to be 61 - 65%.    Left ventricular diastolic function is consistent with (grade I) impaired relaxation.    Mild aortic valve stenosis is present. Mean/peak gradient of 10/22 mmHg. JULIA 1.9cm2.    Estimated right ventricular systolic pressure from tricuspid regurgitation is mildly elevated (35-45 mmHg).      Cardiac catheterization  No results found for this or any previous visit.          Assessment and Plan       Diagnoses and all orders for this visit:    1. Pre-operative cardiovascular examination (Primary)    2. Primary hypertension    3. Mixed hyperlipidemia    4. Heart murmur    5. Screening due    6. Class 1 obesity due to excess calories without serious comorbidity with body mass index (BMI) of 31.0 to 31.9 in adult         Pre-operative cardiovascular examination  She has completed posterior repair for rectocele with no postoperative issues      Primary hypertension  Currently on lisinopril and amlodipine.  I have reviewed her blood pressure log which shows excellent control of blood pressure  I have recommended her to take lisinopril in the morning and amlodipine at night.  She has whitecoat hypertension     Heart murmur  Echocardiogram suggestive of mild aortic valve stenosis with mean/peak gradient of 10/22 mmHg  She has a systolic murmur at the right upper sternal border.  She also has a history of sudden cardiac death in her brother.  Will follow-up based on symptoms and repeat echo in 2 years.  February 2026  Patient is currently asymptomatic.     Hyperlipidemia  , HDL 44, triglyceride 320 and total cholesterol 268  Continue atorvastatin  Repeat lipid panel shows LDL 88, HDL 46, triglyceride 220, total cholesterol 171.    Uncontrolled diabetes  A1c is 13.5-->6 now  She is now on glipizide and metformin.  She has done an excellent job in bringing her A1c down     Obesity  BMI is 31.  She weighs 197 pounds.  Continues to exercise and lose more weight  Lifestyle modifications recommended to the patient.  Specially discussed dietary changes and exercise.  Also recommended screening and treatment for sleep apnea.  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

## 2025-04-09 ENCOUNTER — OFFICE VISIT (OUTPATIENT)
Dept: CARDIOLOGY | Facility: CLINIC | Age: 60
End: 2025-04-09
Payer: COMMERCIAL

## 2025-04-09 VITALS
OXYGEN SATURATION: 97 % | HEART RATE: 91 BPM | HEIGHT: 67 IN | DIASTOLIC BLOOD PRESSURE: 96 MMHG | BODY MASS INDEX: 30.98 KG/M2 | SYSTOLIC BLOOD PRESSURE: 150 MMHG | WEIGHT: 197.4 LBS

## 2025-04-09 DIAGNOSIS — E78.2 MIXED HYPERLIPIDEMIA: ICD-10-CM

## 2025-04-09 DIAGNOSIS — E66.09 CLASS 1 OBESITY DUE TO EXCESS CALORIES WITHOUT SERIOUS COMORBIDITY WITH BODY MASS INDEX (BMI) OF 31.0 TO 31.9 IN ADULT: ICD-10-CM

## 2025-04-09 DIAGNOSIS — Z13.9 SCREENING DUE: ICD-10-CM

## 2025-04-09 DIAGNOSIS — I10 PRIMARY HYPERTENSION: ICD-10-CM

## 2025-04-09 DIAGNOSIS — E66.811 CLASS 1 OBESITY DUE TO EXCESS CALORIES WITHOUT SERIOUS COMORBIDITY WITH BODY MASS INDEX (BMI) OF 31.0 TO 31.9 IN ADULT: ICD-10-CM

## 2025-04-09 DIAGNOSIS — R01.1 HEART MURMUR: ICD-10-CM

## 2025-04-09 DIAGNOSIS — Z01.810 PRE-OPERATIVE CARDIOVASCULAR EXAMINATION: Primary | ICD-10-CM

## 2025-04-09 PROCEDURE — 99214 OFFICE O/P EST MOD 30 MIN: CPT | Performed by: INTERNAL MEDICINE

## 2025-04-09 RX ORDER — GLIPIZIDE 2.5 MG/1
2.5 TABLET, EXTENDED RELEASE ORAL DAILY
COMMUNITY

## 2025-04-21 RX ORDER — ATORVASTATIN CALCIUM 40 MG/1
40 TABLET, FILM COATED ORAL DAILY
Qty: 90 TABLET | Refills: 3 | Status: SHIPPED | OUTPATIENT
Start: 2025-04-21

## 2025-04-21 NOTE — TELEPHONE ENCOUNTER
Rx Refill Note  Requested Prescriptions     Pending Prescriptions Disp Refills    atorvastatin (LIPITOR) 40 MG tablet [Pharmacy Med Name: ATORVASTATIN 40 MG TABLET] 90 tablet 3     Sig: TAKE 1 TABLET BY MOUTH EVERY DAY      Last office visit with prescribing clinician: 4/9/2025    Next office visit with prescribing clinician: 4/8/2026                        Dominga Velazquez MA  04/21/25, 09:02 EDT